# Patient Record
Sex: MALE | Race: WHITE | NOT HISPANIC OR LATINO | URBAN - METROPOLITAN AREA
[De-identification: names, ages, dates, MRNs, and addresses within clinical notes are randomized per-mention and may not be internally consistent; named-entity substitution may affect disease eponyms.]

---

## 2021-04-22 ENCOUNTER — IMMUNIZATION (OUTPATIENT)
Dept: VACCINE CLINIC | Facility: HOSPITAL | Age: 21
End: 2021-04-22

## 2021-04-22 PROCEDURE — 91300 HC SARSCOV02 VAC 30MCG/0.3ML IM: CPT | Performed by: INTERNAL MEDICINE

## 2021-04-22 PROCEDURE — 0001A: CPT | Performed by: INTERNAL MEDICINE

## 2021-05-12 ENCOUNTER — IMMUNIZATION (OUTPATIENT)
Dept: VACCINE CLINIC | Facility: HOSPITAL | Age: 21
End: 2021-05-12

## 2021-05-12 PROCEDURE — 91300 HC SARSCOV02 VAC 30MCG/0.3ML IM: CPT | Performed by: INTERNAL MEDICINE

## 2021-05-12 PROCEDURE — 0002A: CPT | Performed by: INTERNAL MEDICINE

## 2025-03-25 ENCOUNTER — LAB (OUTPATIENT)
Dept: LAB | Facility: HOSPITAL | Age: 25
End: 2025-03-25
Payer: COMMERCIAL

## 2025-03-25 ENCOUNTER — OFFICE VISIT (OUTPATIENT)
Dept: FAMILY MEDICINE CLINIC | Facility: CLINIC | Age: 25
End: 2025-03-25
Payer: COMMERCIAL

## 2025-03-25 VITALS
HEART RATE: 91 BPM | BODY MASS INDEX: 21.7 KG/M2 | SYSTOLIC BLOOD PRESSURE: 118 MMHG | DIASTOLIC BLOOD PRESSURE: 82 MMHG | HEIGHT: 70 IN | RESPIRATION RATE: 20 BRPM | TEMPERATURE: 98.6 F | WEIGHT: 151.6 LBS | OXYGEN SATURATION: 98 %

## 2025-03-25 DIAGNOSIS — Z13.29 SCREENING FOR THYROID DISORDER: ICD-10-CM

## 2025-03-25 DIAGNOSIS — Z76.89 ENCOUNTER TO ESTABLISH CARE WITH NEW DOCTOR: ICD-10-CM

## 2025-03-25 DIAGNOSIS — Z83.3 FAMILY HISTORY OF DIABETES MELLITUS: ICD-10-CM

## 2025-03-25 DIAGNOSIS — Z13.220 SCREENING FOR HYPERLIPIDEMIA: ICD-10-CM

## 2025-03-25 DIAGNOSIS — Z00.00 ANNUAL PHYSICAL EXAM: Primary | ICD-10-CM

## 2025-03-25 DIAGNOSIS — Z30.09 FAMILY PLANNING: ICD-10-CM

## 2025-03-25 DIAGNOSIS — Z23 IMMUNIZATION DUE: ICD-10-CM

## 2025-03-25 DIAGNOSIS — K92.1 BLOOD IN STOOL: ICD-10-CM

## 2025-03-25 LAB
ALBUMIN SERPL-MCNC: 4.3 G/DL (ref 3.5–5.2)
ALBUMIN/GLOB SERPL: 1.6 G/DL
ALP SERPL-CCNC: 63 U/L (ref 39–117)
ALT SERPL W P-5'-P-CCNC: 22 U/L (ref 1–41)
ANION GAP SERPL CALCULATED.3IONS-SCNC: 13.5 MMOL/L (ref 5–15)
AST SERPL-CCNC: 22 U/L (ref 1–40)
BASOPHILS # BLD AUTO: 0.05 10*3/MM3 (ref 0–0.2)
BASOPHILS NFR BLD AUTO: 0.8 % (ref 0–1.5)
BILIRUB SERPL-MCNC: 0.9 MG/DL (ref 0–1.2)
BUN SERPL-MCNC: 12 MG/DL (ref 6–20)
BUN/CREAT SERPL: 12.8 (ref 7–25)
CALCIUM SPEC-SCNC: 9.2 MG/DL (ref 8.6–10.5)
CHLORIDE SERPL-SCNC: 101 MMOL/L (ref 98–107)
CHOLEST SERPL-MCNC: 167 MG/DL (ref 0–200)
CO2 SERPL-SCNC: 24.5 MMOL/L (ref 22–29)
CREAT SERPL-MCNC: 0.94 MG/DL (ref 0.76–1.27)
DEPRECATED RDW RBC AUTO: 37.7 FL (ref 37–54)
EGFRCR SERPLBLD CKD-EPI 2021: 115.4 ML/MIN/1.73
EOSINOPHIL # BLD AUTO: 0.32 10*3/MM3 (ref 0–0.4)
EOSINOPHIL NFR BLD AUTO: 4.9 % (ref 0.3–6.2)
ERYTHROCYTE [DISTWIDTH] IN BLOOD BY AUTOMATED COUNT: 12.1 % (ref 12.3–15.4)
GLOBULIN UR ELPH-MCNC: 2.7 GM/DL
GLUCOSE SERPL-MCNC: 82 MG/DL (ref 65–99)
HBA1C MFR BLD: 4.4 % (ref 4.8–5.6)
HCT VFR BLD AUTO: 46.8 % (ref 37.5–51)
HDLC SERPL-MCNC: 47 MG/DL (ref 40–60)
HGB BLD-MCNC: 16.2 G/DL (ref 13–17.7)
IMM GRANULOCYTES # BLD AUTO: 0.01 10*3/MM3 (ref 0–0.05)
IMM GRANULOCYTES NFR BLD AUTO: 0.2 % (ref 0–0.5)
LDLC SERPL CALC-MCNC: 109 MG/DL (ref 0–100)
LDLC/HDLC SERPL: 2.31 {RATIO}
LYMPHOCYTES # BLD AUTO: 2.03 10*3/MM3 (ref 0.7–3.1)
LYMPHOCYTES NFR BLD AUTO: 31.3 % (ref 19.6–45.3)
MCH RBC QN AUTO: 29.9 PG (ref 26.6–33)
MCHC RBC AUTO-ENTMCNC: 34.6 G/DL (ref 31.5–35.7)
MCV RBC AUTO: 86.5 FL (ref 79–97)
MONOCYTES # BLD AUTO: 0.6 10*3/MM3 (ref 0.1–0.9)
MONOCYTES NFR BLD AUTO: 9.2 % (ref 5–12)
NEUTROPHILS NFR BLD AUTO: 3.48 10*3/MM3 (ref 1.7–7)
NEUTROPHILS NFR BLD AUTO: 53.6 % (ref 42.7–76)
NRBC BLD AUTO-RTO: 0 /100 WBC (ref 0–0.2)
PLATELET # BLD AUTO: 262 10*3/MM3 (ref 140–450)
PMV BLD AUTO: 10.7 FL (ref 6–12)
POTASSIUM SERPL-SCNC: 3.8 MMOL/L (ref 3.5–5.2)
PROT SERPL-MCNC: 7 G/DL (ref 6–8.5)
RBC # BLD AUTO: 5.41 10*6/MM3 (ref 4.14–5.8)
SODIUM SERPL-SCNC: 139 MMOL/L (ref 136–145)
TRIGL SERPL-MCNC: 56 MG/DL (ref 0–150)
TSH SERPL DL<=0.05 MIU/L-ACNC: 3.01 UIU/ML (ref 0.27–4.2)
VLDLC SERPL-MCNC: 11 MG/DL (ref 5–40)
WBC NRBC COR # BLD AUTO: 6.49 10*3/MM3 (ref 3.4–10.8)

## 2025-03-25 PROCEDURE — 80050 GENERAL HEALTH PANEL: CPT

## 2025-03-25 PROCEDURE — 80061 LIPID PANEL: CPT

## 2025-03-25 PROCEDURE — 83036 HEMOGLOBIN GLYCOSYLATED A1C: CPT

## 2025-03-25 NOTE — PATIENT INSTRUCTIONS

## 2025-03-25 NOTE — PROGRESS NOTES
Male Physical Note      Date: 2025   Patient Name: Junaid Hayward  : 2000   MRN: [id number]     Chief Complaint:    Chief Complaint   Patient presents with    [facility] Care    Annual Exam     Referral to urologist       History of Present Illness: Junaid Hayward is a 24 y.o. male who is here today for their annual health maintenance and physical.    No pcp prior.      Reports has 2 children and does not want additional children.  Discussed Vasectomy is considered permanent.  Patient expressed understanding.        No dark or tarry stools, no epigastric pain.  Does report he has had blood per bowel movements for some time.  Usually just on the paper, but has had enough at times to turn toilet water red.            Subjective      Review of Systems:   Review of Systems   Constitutional:  Negative for chills and fever.   Gastrointestinal:  Positive for blood in stool. Negative for constipation, diarrhea, nausea and vomiting.   Neurological:  Negative for seizures and syncope.   Psychiatric/Behavioral:  Negative for suicidal ideas.        Past Medical History, Social History, Family History and Care Team were all reviewed with patient and updated as appropriate.     Medications:     Current Outpatient Medications:     clotrimazole-betamethasone (LOTRISONE) 1-0.05 % cream, Apply 1 application  topically to the appropriate area as directed 2 (Two) Times a Day. (Patient not taking: Reported on 3/25/2025), Disp: 15 g, Rfl: 0    Allergies:   No Known Allergies    Immunizations:  Health Maintenance Summary            Current Care Gaps       HEPATITIS C SCREENING (Once) Never done     No completion, postpone, or frequency change history exists for this topic.                      Upcoming       COVID-19 Vaccine (3 - 2024-25 season) Postponed until 2025  Postponed until 2025 by India Mo MA (Product Unavailable)    2021  Imm Admin: COVID-19 (PFIZER) Purple Cap  "Monovalent    04/22/2021  Imm Admin: COVID-19 (PFIZER) Purple Cap Monovalent              INFLUENZA VACCINE (Yearly - July to March) Postponed until 9/21/2025 03/25/2025  Postponed until 9/21/2025 by India Mo MA (Patient Refused)              ANNUAL PHYSICAL (Yearly) Next due on 3/25/2026      03/25/2025  Done              TDAP/TD VACCINES (2 - Td or Tdap) Next due on 3/25/2035      03/25/2025  Imm Admin: Tdap                      Completed or No Longer Recommended       Pneumococcal Vaccine 0-49 (Series Information) Aged Out     No completion, postpone, or frequency change history exists for this topic.                            Orders Placed This Encounter   Procedures    Tdap Vaccine Greater Than or Equal To 6yo IM       Colorectal Screening:   na  Last Completed Colonoscopy    This patient has no relevant Health Maintenance data.       CT for Smoker (Age 50-80, 20pk yr within last 15 years):  na  Bone Density/DEXA (high risk): na  Hep C (Age 18-79 once):  declined  HIV (Age 15-65 once) : declined  PSA (Over age 50, C Level Recommendation):  na  US Aorta (For male smokers, age 65):  na  A1c:   Hemoglobin A1C   Date Value Ref Range Status   03/25/2025 4.40 (L) 4.80 - 5.60 % Final     Lipid panel: No results found for: \"LABLIPI\"    The ASCVD Risk score (Bassem DK, et al., 2019) failed to calculate for the following reasons:    The 2019 ASCVD risk score is only valid for ages 40 to 79    Dermatology: no  Ophthalmologist: follows  Dentist: not following for about 3 years    Tobacco Use: Low Risk  (3/25/2025)    Patient History     Smoking Tobacco Use: Never     Smokeless Tobacco Use: Never     Passive Exposure: Never       Social History     Substance and Sexual Activity   Alcohol Use Yes    Alcohol/week: 5.0 standard drinks of alcohol    Types: 5 Shots of liquor per week    Comment: 5 drinks per week.        Social History     Substance and Sexual Activity   Drug Use Never        Diet/Physical " "activity: balanced/not a lot of activity.    Sexual health:   condoms contraception used  to have vasectomy.      PHQ-9 Depression Screening  PHQ-9 Total Score:      Measures:   Advanced Care Planning:   Patient does not have an advance directive, declines further assistance.    Smoking Cessation:   Does not smoke     Objective     Physical Exam:  Vital Signs:   Vitals:    03/25/25 1017   BP: 118/82   BP Location: Right arm   Patient Position: Sitting   Cuff Size: Adult   Pulse: 91   Resp: 20   Temp: 98.6 °F (37 °C)   TempSrc: Temporal   SpO2: 98%   Weight: 68.8 kg (151 lb 9.6 oz)   Height: 177.8 cm (70\")   PainSc: 0-No pain     Body mass index is 21.75 kg/m².   BMI is within normal parameters. No other follow-up for BMI required.       Physical Exam  Vitals and nursing note reviewed.   Constitutional:       Appearance: Normal appearance.   HENT:      Head: Normocephalic and atraumatic.   Neck:      Vascular: No carotid bruit.   Cardiovascular:      Rate and Rhythm: Normal rate and regular rhythm.      Heart sounds: Normal heart sounds. No murmur heard.  Pulmonary:      Effort: Pulmonary effort is normal.      Breath sounds: Normal breath sounds.   Abdominal:      General: Bowel sounds are normal.      Palpations: Abdomen is soft. There is no mass.      Tenderness: There is no abdominal tenderness.   Musculoskeletal:      Right lower leg: No edema.      Left lower leg: No edema.   Skin:     Coloration: Skin is not jaundiced or pale.      Findings: No erythema.   Neurological:      Mental Status: He is alert. Mental status is at baseline.   Psychiatric:         Mood and Affect: Mood normal.         Behavior: Behavior normal.          POCT Results (if applicable):   No results found for this or any previous visit.    Procedures    Assessment / Plan      Assessment/Plan:     1. Annual physical exam      2. Blood in stool  Ongoing for some time.  Will refer to Gastroenterology.    - Ambulatory Referral to " Gastroenterology    3. Family planning  Referral to Urology.  Patient is sure he wants a vasectomy.  Does not want additional children.    - Ambulatory Referral to Urology    4. Family history of diabetes mellitus  Check A1C.    - Hemoglobin A1c; Future    5. Screening for thyroid disorder  Check TSH.    - TSH; Future    6. Screening for hyperlipidemia  Check fasting Lipid panel.    - Lipid Panel; Future    7. Immunization due  TDAP vaccine.    - Tdap Vaccine Greater Than or Equal To 8yo IM    8. Encounter to establish care with new doctor  Check CBC, CMP.  - Comprehensive Metabolic Panel; Future  - CBC & Differential; Future        Part of this note may be an electronic transcription/translation of spoken language to printed text using the Dragon Dictation System.      Healthcare Maintenance:  BMI is within normal parameters. No other follow-up for BMI required.    Junaid Hayward voices understanding and acceptance of this advice and will call back with any further questions or concerns. AVS with preventive healthcare tips printed for patient.  Patient Counseling:  Nutrition: Stressed importance of moderation in sodium/caffeine intake, saturated fat and cholesterol, caloric balance, sufficient intake of fresh fruits, vegetables, fiber, calcium and iron.   Exercise: Stressed the importance of regular exercise.   Substance Abuse: Discussed cessation/primary prevention of tobacco, alcohol or other drug use. Driving or other dangerous activities under the influence, availability of treatment or abuse.   Sexuality: Discussed sexually transmitted diseases, partner selection, use of condoms, avoidance or unintended pregnancy and contraceptive alternatives.   Injury prevention: Discussed safety belts, safety helmets, smote detector, smoking near bedding or upholstery.   Dental health: Discussed importance of regular brushing, flossing and dental visits.   Immunizations: Reviewed and discussed.   Colonoscopy: Discussed  screening and benefits.   After hours services discussed with patient.     Vaccine Counseling:      Follow Up:   Return in about 3 months (around 6/25/2025) for Follow Up.      DO MAIA Lawrence

## 2025-03-25 NOTE — LETTER
[facility]  Vaccine Consent Form    Patient Name:  Junaid Hayward  Patient :  2000     Vaccine(s) Ordered    Tdap Vaccine Greater Than or Equal To 8yo IM        Screening Checklist  The following questions should be completed prior to vaccination. If you answer “yes” to any question, it does not necessarily mean you should not be vaccinated. It just means we may need to clarify or ask more questions. If a question is unclear, please ask your healthcare provider to explain it.    Yes No   Any fever or moderate to severe illness today (mild illness and/or antibiotic treatment are not contraindications)?     Do you have a history of a serious reaction to any previous vaccinations, such as anaphylaxis, encephalopathy within 7 days, Guillain-Milton syndrome within 6 weeks, seizure?     Have you received any live vaccine(s) (e.g MMR, TINY) or any other vaccines in the last month (to ensure duplicate doses aren't given)?     Do you have an anaphylactic allergy to latex (DTaP, DTaP-IPV, Hep A, Hep B, MenB, RV, Td, Tdap), baker’s yeast (Hep B, HPV), polysorbates (RSV, nirsevimab, PCV 20, Rotavirrus, Tdap, Shingrix), or gelatin (TINY, MMR)?     Do you have an anaphylactic allergy to neomycin (Rabies, TINY, MMR, IPV, Hep A), polymyxin B (IPV), or streptomycin (IPV)?      Any cancer, leukemia, AIDS, or other immune system disorder? (TINY, MMR, RV)     Do you have a parent, brother, or sister with an immune system problem (if immune competence of vaccine recipient clinically verified, can proceed)? (MMR, TINY)     Any recent steroid treatments for >2 weeks, chemotherapy, or radiation treatment? (TINY, MMR)     Have you received antibody-containing blood transfusions or IVIG in the past 11 months (recommended interval is dependent on product)? (MMR, TINY)     Have you taken antiviral drugs (acyclovir, famciclovir, valacyclovir for TINY) in the last 24 or 48 hours, respectively?      Are you pregnant or planning to become  "pregnant within 1 month? (TINY, MMR, HPV, IPV, MenB, Abrexvy; For Hep B- refer to Engerix-B; For RSV - Abrysvo is indicated for 32-36 weeks of pregnancy from September to January)     For infants, have you ever been told your child has had intussusception or a medical emergency involving obstruction of the intestine (Rotavirus)? If not for an infant, can skip this question.         *Ordering Physicians/APC should be consulted if \"yes\" is checked by the patient or guardian above.  I have received, read, and understand the Vaccine Information Statement (VIS) for each vaccine ordered.  I have considered my or my child's health status as well as the health status of my close contacts.  I have taken the opportunity to discuss my vaccine questions with my or my child's health care provider.   I have requested that the ordered vaccine(s) be given to me or my child.  I understand the benefits and risks of the vaccines.  I understand that I should remain in the clinic for 15 minutes after receiving the vaccine(s).  _________________________________________________________  Signature of Patient or Parent/Legal Guardian ____________________  Date     "

## 2025-03-28 ENCOUNTER — PATIENT ROUNDING (BHMG ONLY) (OUTPATIENT)
Dept: FAMILY MEDICINE CLINIC | Facility: CLINIC | Age: 25
End: 2025-03-28
Payer: COMMERCIAL

## 2025-04-07 RX ORDER — SODIUM, POTASSIUM,MAG SULFATES 17.5-3.13G
SOLUTION, RECONSTITUTED, ORAL ORAL
Qty: 354 ML | Refills: 0 | Status: SHIPPED | OUTPATIENT
Start: 2025-04-07

## 2025-04-15 ENCOUNTER — OUTSIDE FACILITY SERVICE (OUTPATIENT)
Dept: GASTROENTEROLOGY | Facility: CLINIC | Age: 25
End: 2025-04-15
Payer: COMMERCIAL

## 2025-04-15 PROCEDURE — 88305 TISSUE EXAM BY PATHOLOGIST: CPT | Performed by: INTERNAL MEDICINE

## 2025-04-16 ENCOUNTER — LAB REQUISITION (OUTPATIENT)
Dept: LAB | Facility: HOSPITAL | Age: 25
End: 2025-04-16
Payer: COMMERCIAL

## 2025-04-16 DIAGNOSIS — D12.3 BENIGN NEOPLASM OF TRANSVERSE COLON: ICD-10-CM

## 2025-04-16 DIAGNOSIS — K64.8 OTHER HEMORRHOIDS: ICD-10-CM

## 2025-04-16 DIAGNOSIS — D12.2 BENIGN NEOPLASM OF ASCENDING COLON: ICD-10-CM

## 2025-04-16 DIAGNOSIS — K92.1 MELENA: ICD-10-CM

## 2025-04-16 DIAGNOSIS — K62.5 HEMORRHAGE OF ANUS AND RECTUM: ICD-10-CM

## 2025-04-17 LAB
CYTO UR: NORMAL
LAB AP CASE REPORT: NORMAL
LAB AP CLINICAL INFORMATION: NORMAL
PATH REPORT.FINAL DX SPEC: NORMAL
PATH REPORT.GROSS SPEC: NORMAL

## 2025-04-18 ENCOUNTER — OFFICE VISIT (OUTPATIENT)
Dept: UROLOGY | Facility: CLINIC | Age: 25
End: 2025-04-18
Payer: COMMERCIAL

## 2025-04-18 VITALS — HEIGHT: 70 IN | WEIGHT: 150 LBS | BODY MASS INDEX: 21.47 KG/M2

## 2025-04-18 DIAGNOSIS — Z30.09 UNWANTED FERTILITY: Primary | ICD-10-CM

## 2025-04-18 PROCEDURE — 99203 OFFICE O/P NEW LOW 30 MIN: CPT | Performed by: UROLOGY

## 2025-04-18 NOTE — PROGRESS NOTES
Office Note Vasectomy Consult     Patient Name: Juanid Hayward  : 2000   MRN: [id number]     Chief Complaint:  Elective Sterilization.     Referring Provider: Zay Alexandra DO    History of Present Illness: Junaid Hayward is a 24 y.o. male who presents to Urology today with the desire for irreversible, elective sterilization.  The patient reports that he is  with biological children.  He reports that he and his spouse have been considering vasectomy.  He denies any lower urinary tract symptoms.  Denies hematuria.  Denies history of urinary tract infection. Denies prior urologic evaluation or instrumentation.      Subjective      Review of Systems: Review of Systems   Genitourinary:  Negative for decreased urine volume, difficulty urinating, dysuria, enuresis, flank pain, frequency, hematuria and urgency.        I have reviewed the ROS documented by my clinical staff, I have updated appropriately and I agree. Richard Garay MD    Past Medical History: History reviewed. No pertinent past medical history.    Past Surgical History:   Past Surgical History:   Procedure Laterality Date    WISDOM TOOTH EXTRACTION Bilateral     not sure if upper and lower.       Family History:   Family History   Problem Relation Age of Onset    Diabetes Maternal Grandmother     Diabetes Maternal Uncle        Social History:   Social History     Socioeconomic History    Marital status:    Tobacco Use    Smoking status: Never     Passive exposure: Never    Smokeless tobacco: Never   Vaping Use    Vaping status: Never Used   Substance and Sexual Activity    Alcohol use: Yes     Alcohol/week: 5.0 standard drinks of alcohol     Types: 5 Shots of liquor per week     Comment: 5 drinks per week.    Drug use: Never    Sexual activity: Yes     Partners: Female     Birth control/protection: Condom       Medications:     Current Outpatient Medications:     clotrimazole-betamethasone (LOTRISONE) 1-0.05 % cream,  "Apply 1 application  topically to the appropriate area as directed 2 (Two) Times a Day. (Patient not taking: Reported on 3/25/2025), Disp: 15 g, Rfl: 0    sodium-potassium-magnesium sulfates (Suprep Bowel Prep Kit) 17.5-3.13-1.6 GM/177ML solution oral solution, Take First Dose at 5 pm Take Second Dose at 10 pm, Nothing to eat or drink after midnight. May substitute for Golytely or Moviprep. Follow instructions provided by Office. Call [phone] with questions., Disp: 354 mL, Rfl: 0    Allergies:   No Known Allergies      Objective     Physical Exam:   Vital Signs:   Vitals:    04/18/25 1016   Weight: 68 kg (150 lb)   Height: 177.8 cm (70\")     Body mass index is 21.52 kg/m².     Physical Exam  Vitals and nursing note reviewed.   Constitutional:       Appearance: Normal appearance.   HENT:      Head: Normocephalic and atraumatic.   Cardiovascular:      Comments: Well perfused  Pulmonary:      Effort: Pulmonary effort is normal.   Abdominal:      General: Abdomen is flat.      Palpations: Abdomen is soft.   Musculoskeletal:         General: Normal range of motion.   Skin:     General: Skin is warm and dry.   Neurological:      General: No focal deficit present.      Mental Status: He is alert and oriented to person, place, and time. Mental status is at baseline.   Psychiatric:         Mood and Affect: Mood normal.         Behavior: Behavior normal.         Thought Content: Thought content normal.         Judgment: Judgment normal.             Labs:   Brief Urine Lab Results       None                 Lab Results   Component Value Date    GLUCOSE 82 03/25/2025    CALCIUM 9.2 03/25/2025     03/25/2025    K 3.8 03/25/2025    CO2 24.5 03/25/2025     03/25/2025    BUN 12 03/25/2025    CREATININE 0.94 03/25/2025    BCR 12.8 03/25/2025    ANIONGAP 13.5 03/25/2025       Lab Results   Component Value Date    WBC 6.49 03/25/2025    HGB 16.2 03/25/2025    HCT 46.8 03/25/2025    MCV 86.5 03/25/2025     " 03/25/2025       Images:   No Images in the past 120 days found..    Measures:   Tobacco:   Junaid Hayward  reports that he has never smoked. He has never been exposed to tobacco smoke. He has never used smokeless tobacco. I have educated him on the risk of diseases from using tobacco product    Assessment / Plan      Assessment/Plan:   Mr. Hayward is a 24 y.o. male who presents today requesting permanent, irreversible surgical sterilization.     Diagnoses and all orders for this visit:    1. Unwanted fertility (Primary)  -     External Facility Surgical/Procedural Request; Future         Patient Education:   Today we discussed the risks and benefits of irreversible and permanent surgical sterilization.  The vasectomy procedure was discussed in detail with the patient. Risks including failure of the procedure, infection, bleeding, development of chronic testicular pain, and the possibility of injuring adjacent structures which could potentially result in loss of the testicle were discussed in depth..  Furthermore, the patient was told he would remain fertile following the procedure until he provided a semen analysis after 12 weeks and 20 ejaculations post-procedure. Discussed that semen analysis will be reviewed and he will be contacted with results. The patient is understanding that any unprotected intercourse can result in pregnancy until he provides a semen analysis at above interval.  He is understanding that he requires birth control method until his semen analysis is performed and completed.The patient expressed understanding and desire to proceed.  He will be scheduled for vasectomy at his convenience.           I spent approximately 30 minutes providing clinical care for this patient; including review of patient's chart and provider documentation, face to face time spent with patient in examination room (obtaining history, performing physical exam, discussing diagnosis and management options), placing  orders, and completing patient documentation.     Richard Garay MD  [facility] [place]

## 2025-04-22 PROBLEM — Z30.09 UNWANTED FERTILITY: Status: ACTIVE | Noted: 2025-04-22

## 2025-05-01 ENCOUNTER — DOCUMENTATION (OUTPATIENT)
Dept: UROLOGY | Facility: CLINIC | Age: 25
End: 2025-05-01

## 2025-05-01 ENCOUNTER — OUTSIDE FACILITY SERVICE (OUTPATIENT)
Dept: UROLOGY | Facility: CLINIC | Age: 25
End: 2025-05-01
Payer: COMMERCIAL

## 2025-05-01 NOTE — PROGRESS NOTES
[facility]       OPERATIVE REPORT - VASECTOMY    Patient Name:  Junaid Hayward  YOB: 2000    Patient MRN: [id number]    Date of Surgery: 5/1/25      Indications:  25 yo M who desires elective sterilization presents for bilateral vasectomy after discussion of risks and benefits. Informed consent reviewed and signed.      Pre-op Diagnosis:   Encounter for Sterilization     Post-op Diagnosis:   Encounter for Sterilization     Procedure Performed: Bilateral Vasectomy    Procedure/CPT® Codes: 47935     Staff:  Richard Garay MD    Anesthesia: General    Estimated Blood Loss: Minimal    Implants:  None    Specimen:  Bilateral vas deferens    Drains: None      Findings:  - Uncomplicated bilateral vasectomy  - 1 cm segments of the bilateral vas deferens sent to pathology     Description of Procedure:   The patient was identified and informed consent was reviewed and signed.  He was placed in the supine position.  His genitals were prepped and draped in sterile fashion.  The right vas deferens was isolated. Local anesthetic was injected at the skin and deep to the dartos tissue.    After anesthesia confirmed, an 11 blade scalpel was used to make a small skin incision in the right lateral hemiscrotum.   A ring clamp was used to isolate the vas deferens and pull this out through the skin. Bovie cautery to cauterize  perivasal vessels.  The right vas deferens was skeletonized over an area of 1-1/2 cm.  2 hemostat clamps were used to clamp the testicular and abdominal ends of the vas deferens.  Between the area of the hemostat a 1 cm segment of the vas was transected.  Electrocautery was used to cauterize the ends of the vas deferens. The right sided segment was sent off to pathology.  Using a clip applier a clip was placed on the abdominal end of the vas deferens.  Once hemostasis was confirmed the vas deferens and tissue was delivered back into the scrotum. A 4-0 chromic suture was used to  sew a horizontal mattress stitch at the skin incision.  Hemostasis was confirmed.      And identical procedure was performed on the left side.  The left-sided vas deferens segment was sent off to pathology.  The skin was closed with a horizontal mattress 4-0 chromic suture again. Neosporin was applied over the incisions and scrotal fluff gauze were placed.  The patient tolerated the procedure well.    Complications: None     Follow Up: Patient will complete 20 ejaculations and 12 weeks before he provides us a semen analysis.  He was sent home with bacitracin to apply over his incision for 7 days.  Return precautions discussed at length.  The patient is understanding that any unprotected intercourse can result in pregnancy until he provides a semen analysis at above interval.  He is understanding that he requires birth control method until his semen analysis is performed and completed.  He expresses understanding and agreement with this.  Will be contacted once his semen analysis performed.    Richard Garay MD     Date: 5/1/2025  Time: 09:32 EDT

## 2025-06-25 ENCOUNTER — OFFICE VISIT (OUTPATIENT)
Dept: FAMILY MEDICINE CLINIC | Facility: CLINIC | Age: 25
End: 2025-06-25
Payer: COMMERCIAL

## 2025-06-25 VITALS
BODY MASS INDEX: 22.19 KG/M2 | HEIGHT: 70 IN | HEART RATE: 74 BPM | OXYGEN SATURATION: 98 % | WEIGHT: 155 LBS | SYSTOLIC BLOOD PRESSURE: 126 MMHG | TEMPERATURE: 98.6 F | DIASTOLIC BLOOD PRESSURE: 72 MMHG

## 2025-06-25 DIAGNOSIS — F41.9 ANXIETY: Primary | ICD-10-CM

## 2025-06-25 DIAGNOSIS — K64.8 INTERNAL HEMORRHOIDS: ICD-10-CM

## 2025-06-25 DIAGNOSIS — Z86.0100 HISTORY OF COLON POLYPS: ICD-10-CM

## 2025-06-25 RX ORDER — BUSPIRONE HYDROCHLORIDE 5 MG/1
5 TABLET ORAL 2 TIMES DAILY
Qty: 60 TABLET | Refills: 1 | Status: SHIPPED | OUTPATIENT
Start: 2025-06-25

## 2025-06-25 NOTE — PROGRESS NOTES
"    Follow Up Office Visit      Date: 2025   Patient Name: Junaid Hayward  : 2000   MRN: [id number]     Chief Complaint:    Chief Complaint   Patient presents with    Anxiety       History of Present Illness: Junaid Hayward is a 25 y.o. male who presents today for follow-up.    Patient had reported blood in stool and was referred to gastroenterology.    Patient requested referral to urology due to wanting a vasectomy.    A1c was 4.4 on 3/25/2025.  Chemistry panel results were good, TSH was normal at 3.010.  Cholesterol 167, , HDL 47, triglycerides 56    Takes otc allergy medication only.      Repeat colon in 3 years per patient report.  4/15/25  colonoscopy done.            Subjective      Review of Systems:   Review of Systems   Constitutional:  Negative for chills and fever.   Gastrointestinal:  Negative for blood in stool, nausea and vomiting.   Neurological:  Negative for seizures and syncope.   Psychiatric/Behavioral:  The patient is nervous/anxious.        I have reviewed the patients family history, social history, past medical history, past surgical history and have updated it as appropriate.     Medications:     Current Outpatient Medications:     busPIRone (BUSPAR) 5 MG tablet, Take 1 tablet by mouth 2 (Two) Times a Day., Disp: 60 tablet, Rfl: 1    Allergies:   No Known Allergies    Objective     Physical Exam: Please see above  Vital Signs:   Vitals:    25 1157   BP: 126/72   Pulse: 74   Temp: 98.6 °F (37 °C)   TempSrc: Infrared   SpO2: 98%   Weight: 70.3 kg (155 lb)   Height: 177.8 cm (70\")   PainSc: 0-No pain     Body mass index is 22.24 kg/m².  BMI is within normal parameters. No other follow-up for BMI required.       Physical Exam  Vitals and nursing note reviewed.   Constitutional:       Appearance: Normal appearance.   HENT:      Head: Normocephalic and atraumatic.   Neck:      Vascular: No carotid bruit.   Cardiovascular:      Rate and Rhythm: Normal rate and " regular rhythm.      Heart sounds: Normal heart sounds. No murmur heard.  Pulmonary:      Effort: Pulmonary effort is normal.      Breath sounds: Normal breath sounds.   Abdominal:      General: Bowel sounds are normal.      Palpations: Abdomen is soft. There is no mass.      Tenderness: There is no abdominal tenderness.   Musculoskeletal:      Right lower leg: No edema.      Left lower leg: No edema.   Skin:     Coloration: Skin is not jaundiced or pale.      Findings: No erythema.   Neurological:      Mental Status: He is alert. Mental status is at baseline.   Psychiatric:         Mood and Affect: Mood normal.         Behavior: Behavior normal.         Procedures    Results:   Labs:   Hemoglobin A1C   Date Value Ref Range Status   03/25/2025 4.40 (L) 4.80 - 5.60 % Final     TSH   Date Value Ref Range Status   03/25/2025 3.010 0.270 - 4.200 uIU/mL Final        POCT Results (if applicable):   Results for orders placed or performed in visit on 04/15/25   Tissue Pathology Exam    Collection Time: 04/15/25 12:35 PM    Specimen: 1: Large Intestine, Right / Ascending Colon; Tissue    2: Large Intestine, Hepatic Flexure; Tissue   Result Value Ref Range    Case Report       Surgical Pathology Report                         Case: [id number]                                  Authorizing Provider:  Eladio Chadwick MD   Collected:           04/15/2025 12:35 PM          Ordering Location:     [facility]   Received:            04/16/2025 10:11 AM                                 LABORATORY                                                                   Pathologist:           Dane Rinaldi MD                                                        Specimens:   1) - Large Intestine, Right / Ascending Colon                                                       2) - Large Intestine, Hepatic Flexure                                                      Clinical Information       Hemorrhage of anus and  "rectum  Benign neoplasm of ascending colon  Benign neoplasm of transverse colon  Other hemorrhoids  Melena      Final Diagnosis       1.  RIGHT COLON POLYP:  Sessile serrated adenoma.  No cytologic atypia or high-grade dysplasia identified.    2.  HEPATIC FLEXURE POLYP:  Sessile serrated adenoma.  No cytologic atypia or high-grade dysplasia identified.        Gross Description       1. Large Intestine, Right / Ascending Colon.  Received in formalin labeled \"right colon\" and consists of a pale-tan soft tissue fragment measuring 0.4 x 0.2 x 0.2 cm.  The specimen is submitted entirely in cassette 1A.    2. Large Intestine, Hepatic Flexure.  Received in formalin labeled \"hepatic flexure\" and consists of a pale-tan soft tissue fragment measuring 0.6 x 0.3 x 0.2 cm.  The specimen is submitted entirely in cassette 2A. [facility]        Microscopic Description       The slides are reviewed and demonstrate histopathologic features supporting the above rendered diagnosis.           PHQ-9: PHQ-9 Total Score:       Imaging:   No valid procedures specified.     Measures:   Advanced Care Planning:   Patient does not have an advance directive, declines further assistance.    Smoking Cessation:   Does not smoke    Assessment / Plan      Assessment/Plan:     1. Anxiety  Patient to start BuSpar twice daily.  Short follow-up in 1 month  - busPIRone (BUSPAR) 5 MG tablet; Take 1 tablet by mouth 2 (Two) Times a Day.  Dispense: 60 tablet; Refill: 1    2. History of colon polyps  Patient to have repeat colonoscopy in 3 years per his report .  4/2028    3. Internal hemorrhoids  Encourage patient drink plenty water to keep stools soft.  Can take stool softener if needed          Part of this note may be an electronic transcription/translation of spoken language to printed text using the Dragon Dictation System.        Vaccine Counseling:      Follow Up:   Return in about 4 weeks (around 7/23/2025) for Follow Up anxiety..        MAIA Zamora"

## 2025-07-22 ENCOUNTER — OFFICE VISIT (OUTPATIENT)
Dept: FAMILY MEDICINE CLINIC | Facility: CLINIC | Age: 25
End: 2025-07-22
Payer: COMMERCIAL

## 2025-07-22 VITALS
DIASTOLIC BLOOD PRESSURE: 70 MMHG | SYSTOLIC BLOOD PRESSURE: 100 MMHG | OXYGEN SATURATION: 98 % | TEMPERATURE: 98.4 F | HEIGHT: 70 IN | HEART RATE: 85 BPM | RESPIRATION RATE: 20 BRPM | BODY MASS INDEX: 22.22 KG/M2 | WEIGHT: 155.2 LBS

## 2025-07-22 DIAGNOSIS — F41.9 ANXIETY: ICD-10-CM

## 2025-07-22 DIAGNOSIS — F32.A DEPRESSION, UNSPECIFIED DEPRESSION TYPE: Primary | ICD-10-CM

## 2025-07-22 RX ORDER — BUPROPION HYDROCHLORIDE 150 MG/1
150 TABLET ORAL DAILY
Qty: 30 TABLET | Refills: 1 | Status: SHIPPED | OUTPATIENT
Start: 2025-07-22

## 2025-07-22 RX ORDER — BUSPIRONE HYDROCHLORIDE 10 MG/1
10 TABLET ORAL 2 TIMES DAILY
Qty: 60 TABLET | Refills: 1 | Status: SHIPPED | OUTPATIENT
Start: 2025-07-22

## 2025-07-22 NOTE — PATIENT INSTRUCTIONS
Take medications as ordered.  Low fat, low salt diet.  Exercise as tolerated.   Start antihistamine daily.  Start Wellbutrin daily.

## 2025-07-22 NOTE — PROGRESS NOTES
Follow Up Office Visit      Date: 2025   Patient Name: Junaid Hayward  : 2000   MRN: [id number]     Chief Complaint:    Chief Complaint   Patient presents with    Anxiety       History of Present Illness: Junaid Hayward is a 25 y.o. male who presents today       Previously reported blood in stool and was referred to gastroenterology.     Repeat colon in 3 years per patient report.  4/15/25  colonoscopy done.  History of internal hemorrhoids and history of colon polyps    Previously referred to urology due to wanting vasectomy.      A1c was 4.4 on 3/25/2025.  Chemistry panel results were good, TSH was normal at 3.010.  Cholesterol 167, , HDL 47, triglycerides 56    Patient was recently to start BuSpar twice daily for anxiety.    Reports has been taking.  Reports not always taking twice daily.  Reports the original reason he thought he needed the medication is trouble getting an erection.     Reports he feels his anxiety is not constant, and feels he may have some depression.         History of Present Illness        Subjective      Review of Systems:   Review of Systems   Constitutional:  Negative for chills and fever.   HENT:  Positive for postnasal drip.    Gastrointestinal:  Negative for nausea and vomiting.   Neurological:  Negative for seizures and syncope.   Psychiatric/Behavioral:  Positive for depressed mood. The patient is nervous/anxious.        I have reviewed the patients family history, social history, past medical history, past surgical history and have updated it as appropriate.     Medications:     Current Outpatient Medications:     amoxicillin-clavulanate (AUGMENTIN) 875-125 MG per tablet, Take 1 tablet by mouth 2 (Two) Times a Day., Disp: , Rfl:     busPIRone (BUSPAR) 10 MG tablet, Take 1 tablet by mouth 2 (Two) Times a Day., Disp: 60 tablet, Rfl: 1    buPROPion XL (WELLBUTRIN XL) 150 MG 24 hr tablet, Take 1 tablet by mouth Daily., Disp: 30 tablet, Rfl:  "1    Allergies:   No Known Allergies    Objective     Physical Exam: Please see above  Vital Signs:   Vitals:    07/22/25 1149   BP: 100/70   BP Location: Right arm   Patient Position: Sitting   Cuff Size: Adult   Pulse: 85   Resp: 20   Temp: 98.4 °F (36.9 °C)   TempSrc: Temporal   SpO2: 98%   Weight: 70.4 kg (155 lb 3.2 oz)   Height: 177.8 cm (70\")   PainSc: 0-No pain     Body mass index is 22.27 kg/m².  BMI is within normal parameters. No other follow-up for BMI required.       Physical Exam  Vitals and nursing note reviewed.   Constitutional:       Appearance: Normal appearance.   HENT:      Head: Normocephalic and atraumatic.   Neck:      Vascular: No carotid bruit.   Cardiovascular:      Rate and Rhythm: Normal rate and regular rhythm.      Heart sounds: Normal heart sounds. No murmur heard.  Pulmonary:      Effort: Pulmonary effort is normal.      Breath sounds: Normal breath sounds.   Abdominal:      General: Bowel sounds are normal.      Palpations: Abdomen is soft. There is no mass.      Tenderness: There is no abdominal tenderness.   Musculoskeletal:      Right lower leg: No edema.      Left lower leg: No edema.   Skin:     Coloration: Skin is not jaundiced or pale.      Findings: No erythema.   Neurological:      Mental Status: He is alert. Mental status is at baseline.   Psychiatric:         Mood and Affect: Mood normal.         Behavior: Behavior normal.         Procedures    Results:   Labs:   Hemoglobin A1C   Date Value Ref Range Status   03/25/2025 4.40 (L) 4.80 - 5.60 % Final     TSH   Date Value Ref Range Status   03/25/2025 3.010 0.270 - 4.200 uIU/mL Final        POCT Results (if applicable):   Results for orders placed or performed in visit on 04/15/25   Tissue Pathology Exam    Collection Time: 04/15/25 12:35 PM    Specimen: 1: Large Intestine, Right / Ascending Colon; Tissue    2: Large Intestine, Hepatic Flexure; Tissue   Result Value Ref Range    Case Report       Surgical Pathology Report    " "                     Case: [id number]                                  Authorizing Provider:  Eladio Chadwick MD   Collected:           04/15/2025 12:35 PM          Ordering Location:     [facility]   Received:            04/16/2025 10:11 AM                                 LABORATORY                                                                   Pathologist:           Dane Rinaldi MD                                                        Specimens:   1) - Large Intestine, Right / Ascending Colon                                                       2) - Large Intestine, Hepatic Flexure                                                      Clinical Information       Hemorrhage of anus and rectum  Benign neoplasm of ascending colon  Benign neoplasm of transverse colon  Other hemorrhoids  Melena      Final Diagnosis       1.  RIGHT COLON POLYP:  Sessile serrated adenoma.  No cytologic atypia or high-grade dysplasia identified.    2.  HEPATIC FLEXURE POLYP:  Sessile serrated adenoma.  No cytologic atypia or high-grade dysplasia identified.        Gross Description       1. Large Intestine, Right / Ascending Colon.  Received in formalin labeled \"right colon\" and consists of a pale-tan soft tissue fragment measuring 0.4 x 0.2 x 0.2 cm.  The specimen is submitted entirely in cassette 1A.    2. Large Intestine, Hepatic Flexure.  Received in formalin labeled \"hepatic flexure\" and consists of a pale-tan soft tissue fragment measuring 0.6 x 0.3 x 0.2 cm.  The specimen is submitted entirely in cassette 2A. [facility]        Microscopic Description       The slides are reviewed and demonstrate histopathologic features supporting the above rendered diagnosis.           PHQ-9: PHQ-9 Total Score:       Imaging:   No valid procedures specified.     Measures:   Advanced Care Planning:   Patient does not have an advance directive, declines further assistance.    Smoking Cessation:   Does not smoke    Assessment / " Plan      Assessment/Plan:     Assessment & Plan      1. Anxiety  Continue Buspar.  - busPIRone (BUSPAR) 10 MG tablet; Take 1 tablet by mouth 2 (Two) Times a Day.  Dispense: 60 tablet; Refill: 1    2. Depression, unspecified depression type  Will start Wellbutrin XL daily.  Follow-up in 2 months.    - buPROPion XL (WELLBUTRIN XL) 150 MG 24 hr tablet; Take 1 tablet by mouth Daily.  Dispense: 30 tablet; Refill: 1          Part of this note may be an electronic transcription/translation of spoken language to printed text using the Dragon Dictation System.      Vaccine Counseling:      Follow Up:   Return in about 2 months (around 9/22/2025).          MAIA Zamora

## 2025-07-24 ENCOUNTER — LAB (OUTPATIENT)
Dept: LAB | Facility: HOSPITAL | Age: 25
End: 2025-07-24
Payer: COMMERCIAL

## 2025-07-24 ENCOUNTER — TELEPHONE (OUTPATIENT)
Dept: UROLOGY | Facility: CLINIC | Age: 25
End: 2025-07-24
Payer: COMMERCIAL

## 2025-07-24 DIAGNOSIS — Z30.09 UNWANTED FERTILITY: ICD-10-CM

## 2025-07-24 DIAGNOSIS — Z30.09 UNWANTED FERTILITY: Primary | ICD-10-CM

## 2025-07-24 LAB — SPERM - POST VASECTOMY: NORMAL

## 2025-07-24 PROCEDURE — 89321 SEMEN ANAL SPERM DETECTION: CPT
